# Patient Record
Sex: FEMALE | Race: WHITE | Employment: FULL TIME | URBAN - METROPOLITAN AREA
[De-identification: names, ages, dates, MRNs, and addresses within clinical notes are randomized per-mention and may not be internally consistent; named-entity substitution may affect disease eponyms.]

---

## 2017-08-02 ENCOUNTER — APPOINTMENT (OUTPATIENT)
Dept: CT IMAGING | Age: 42
End: 2017-08-02
Payer: OTHER GOVERNMENT

## 2017-08-02 ENCOUNTER — HOSPITAL ENCOUNTER (EMERGENCY)
Age: 42
Discharge: ANOTHER HOSPITAL | End: 2017-08-02
Attending: EMERGENCY MEDICINE
Payer: OTHER GOVERNMENT

## 2017-08-02 VITALS
RESPIRATION RATE: 20 BRPM | OXYGEN SATURATION: 97 % | SYSTOLIC BLOOD PRESSURE: 120 MMHG | DIASTOLIC BLOOD PRESSURE: 72 MMHG | HEART RATE: 63 BPM | WEIGHT: 145 LBS | HEIGHT: 66 IN | BODY MASS INDEX: 23.3 KG/M2 | TEMPERATURE: 98.6 F

## 2017-08-02 DIAGNOSIS — N20.0 KIDNEY STONE: Primary | ICD-10-CM

## 2017-08-02 LAB
ABSOLUTE EOS #: 0.1 K/UL (ref 0–0.4)
ABSOLUTE LYMPH #: 0.9 K/UL (ref 1–4.8)
ABSOLUTE MONO #: 0.4 K/UL (ref 0.1–1.2)
ALBUMIN SERPL-MCNC: 4.3 G/DL (ref 3.5–5.2)
ALBUMIN/GLOBULIN RATIO: 1.4 (ref 1–2.5)
ALP BLD-CCNC: 116 U/L (ref 35–104)
ALT SERPL-CCNC: 32 U/L (ref 5–33)
AMYLASE: 36 U/L (ref 28–100)
ANION GAP SERPL CALCULATED.3IONS-SCNC: 15 MMOL/L (ref 9–17)
AST SERPL-CCNC: 24 U/L
BASOPHILS # BLD: 1 %
BASOPHILS ABSOLUTE: 0 K/UL (ref 0–0.2)
BILIRUB SERPL-MCNC: 0.36 MG/DL (ref 0.3–1.2)
BILIRUBIN DIRECT: 0.09 MG/DL
BILIRUBIN, INDIRECT: 0.27 MG/DL (ref 0–1)
BUN BLDV-MCNC: 13 MG/DL (ref 6–20)
BUN/CREAT BLD: ABNORMAL (ref 9–20)
CALCIUM SERPL-MCNC: 9.3 MG/DL (ref 8.6–10.4)
CHLORIDE BLD-SCNC: 103 MMOL/L (ref 98–107)
CO2: 23 MMOL/L (ref 20–31)
CREAT SERPL-MCNC: 0.81 MG/DL (ref 0.5–0.9)
DIFFERENTIAL TYPE: ABNORMAL
EOSINOPHILS RELATIVE PERCENT: 1 %
GFR AFRICAN AMERICAN: >60 ML/MIN
GFR NON-AFRICAN AMERICAN: >60 ML/MIN
GFR SERPL CREATININE-BSD FRML MDRD: ABNORMAL ML/MIN/{1.73_M2}
GFR SERPL CREATININE-BSD FRML MDRD: ABNORMAL ML/MIN/{1.73_M2}
GLOBULIN: ABNORMAL G/DL (ref 1.5–3.8)
GLUCOSE BLD-MCNC: 135 MG/DL (ref 70–99)
HCG QUALITATIVE: NEGATIVE
HCT VFR BLD CALC: 30.6 % (ref 36–46)
HEMOGLOBIN: 9.4 G/DL (ref 12–16)
LACTIC ACID: 1.5 MMOL/L (ref 0.5–2.2)
LIPASE: 16 U/L (ref 13–60)
LYMPHOCYTES # BLD: 17 %
MCH RBC QN AUTO: 24.1 PG (ref 26–34)
MCHC RBC AUTO-ENTMCNC: 30.6 G/DL (ref 31–37)
MCV RBC AUTO: 78.8 FL (ref 80–100)
MONOCYTES # BLD: 7 %
PDW BLD-RTO: 15 % (ref 12.5–15.4)
PLATELET # BLD: 265 K/UL (ref 140–450)
PLATELET ESTIMATE: ABNORMAL
PMV BLD AUTO: 8.8 FL (ref 6–12)
POTASSIUM SERPL-SCNC: 4 MMOL/L (ref 3.7–5.3)
RBC # BLD: 3.89 M/UL (ref 4–5.2)
RBC # BLD: ABNORMAL 10*6/UL
SEG NEUTROPHILS: 74 %
SEGMENTED NEUTROPHILS ABSOLUTE COUNT: 4.1 K/UL (ref 1.8–7.7)
SODIUM BLD-SCNC: 141 MMOL/L (ref 135–144)
TOTAL PROTEIN: 7.4 G/DL (ref 6.4–8.3)
WBC # BLD: 5.5 K/UL (ref 3.5–11)
WBC # BLD: ABNORMAL 10*3/UL

## 2017-08-02 PROCEDURE — 84703 CHORIONIC GONADOTROPIN ASSAY: CPT

## 2017-08-02 PROCEDURE — 2580000003 HC RX 258: Performed by: EMERGENCY MEDICINE

## 2017-08-02 PROCEDURE — 83690 ASSAY OF LIPASE: CPT

## 2017-08-02 PROCEDURE — 80076 HEPATIC FUNCTION PANEL: CPT

## 2017-08-02 PROCEDURE — 83605 ASSAY OF LACTIC ACID: CPT

## 2017-08-02 PROCEDURE — 36415 COLL VENOUS BLD VENIPUNCTURE: CPT

## 2017-08-02 PROCEDURE — 96374 THER/PROPH/DIAG INJ IV PUSH: CPT

## 2017-08-02 PROCEDURE — 6360000002 HC RX W HCPCS: Performed by: EMERGENCY MEDICINE

## 2017-08-02 PROCEDURE — 85025 COMPLETE CBC W/AUTO DIFF WBC: CPT

## 2017-08-02 PROCEDURE — 80048 BASIC METABOLIC PNL TOTAL CA: CPT

## 2017-08-02 PROCEDURE — 74176 CT ABD & PELVIS W/O CONTRAST: CPT

## 2017-08-02 PROCEDURE — 96375 TX/PRO/DX INJ NEW DRUG ADDON: CPT

## 2017-08-02 PROCEDURE — 82150 ASSAY OF AMYLASE: CPT

## 2017-08-02 PROCEDURE — 99285 EMERGENCY DEPT VISIT HI MDM: CPT

## 2017-08-02 RX ORDER — 0.9 % SODIUM CHLORIDE 0.9 %
1000 INTRAVENOUS SOLUTION INTRAVENOUS ONCE
Status: COMPLETED | OUTPATIENT
Start: 2017-08-02 | End: 2017-08-02

## 2017-08-02 RX ORDER — KETOROLAC TROMETHAMINE 15 MG/ML
15 INJECTION, SOLUTION INTRAMUSCULAR; INTRAVENOUS ONCE
Status: COMPLETED | OUTPATIENT
Start: 2017-08-02 | End: 2017-08-02

## 2017-08-02 RX ORDER — VENLAFAXINE HYDROCHLORIDE 150 MG/1
150 CAPSULE, EXTENDED RELEASE ORAL DAILY
COMMUNITY

## 2017-08-02 RX ORDER — MORPHINE SULFATE 2 MG/ML
2 INJECTION, SOLUTION INTRAMUSCULAR; INTRAVENOUS ONCE
Status: COMPLETED | OUTPATIENT
Start: 2017-08-02 | End: 2017-08-02

## 2017-08-02 RX ORDER — ONDANSETRON 2 MG/ML
4 INJECTION INTRAMUSCULAR; INTRAVENOUS ONCE
Status: COMPLETED | OUTPATIENT
Start: 2017-08-02 | End: 2017-08-02

## 2017-08-02 RX ADMIN — MORPHINE SULFATE 2 MG: 2 INJECTION, SOLUTION INTRAMUSCULAR; INTRAVENOUS at 14:47

## 2017-08-02 RX ADMIN — MORPHINE SULFATE 2 MG: 2 INJECTION, SOLUTION INTRAMUSCULAR; INTRAVENOUS at 13:57

## 2017-08-02 RX ADMIN — MORPHINE SULFATE 2 MG: 2 INJECTION, SOLUTION INTRAMUSCULAR; INTRAVENOUS at 14:16

## 2017-08-02 ASSESSMENT — PAIN SCALES - GENERAL
PAINLEVEL_OUTOF10: 8
PAINLEVEL_OUTOF10: 8
PAINLEVEL_OUTOF10: 6
PAINLEVEL_OUTOF10: 9
PAINLEVEL_OUTOF10: 8

## 2017-08-02 ASSESSMENT — PAIN DESCRIPTION - DESCRIPTORS
DESCRIPTORS: ACHING;SHARP
DESCRIPTORS: ACHING;SHARP;SPASM
DESCRIPTORS: ACHING;SHARP
DESCRIPTORS: ACHING;SHARP

## 2017-08-02 ASSESSMENT — PAIN DESCRIPTION - LOCATION
LOCATION: ABDOMEN

## 2017-08-02 ASSESSMENT — PAIN DESCRIPTION - ORIENTATION
ORIENTATION: RIGHT;LOWER
ORIENTATION: RIGHT;LOWER
ORIENTATION: RIGHT

## 2017-08-02 ASSESSMENT — PAIN DESCRIPTION - PAIN TYPE
TYPE: ACUTE PAIN

## 2017-08-02 ASSESSMENT — ENCOUNTER SYMPTOMS: ABDOMINAL PAIN: 1

## 2017-08-02 NOTE — ED NOTES
PT transferred to [address] [place] [address] via [facility]. Pt belongings with pt, IV int'd. Pt alert and oriented.       Mariia Christopher RN  08/02/17 0757

## 2017-08-02 NOTE — ED PROVIDER NOTES
[facility] ED  [address] [address]  Phone: [phone]  Fax: [phone]        Pt Name: Jefferson Maguire  MRN: [id number]  [place] 1975  Date of evaluation: 8/2/17      CHIEF COMPLAINT       Chief Complaint   Patient presents with    Abdominal Pain         HISTORY OF PRESENT ILLNESS  (Location/Symptom, Timing/Onset, Context/Setting, Quality, Duration, Modifying Factors, Severity.)    Jefferson Maguire is a 36 y.o. female who presents with abdominal pain. The patient states she has the sudden onset of right lower quadrant abdominal pain. It is a sharp stabbing pain of which certainly movements makes her pain worse nothing makes it better. The patient denies any trauma. No blood in the urine or stool no fever no chills no vomiting no diarrhea, no bad food exposure. The patient does relate that she has had gastric bypass surgery and has had her gallbladder removed       REVIEW OF SYSTEMS    (2-9 systems for level 4, 10 or more for level 5)     Review of Systems   Gastrointestinal: Positive for abdominal pain. All other systems reviewed and are negative. PAST MEDICAL HISTORY    has no past medical history on file. SURGICAL HISTORY      has a past surgical history that includes Cholecystectomy; Gastric bypass surgery; and Ovary removal.    CURRENT MEDICATIONS       Previous Medications    VENLAFAXINE (EFFEXOR XR) 150 MG EXTENDED RELEASE CAPSULE    Take 150 mg by mouth daily       ALLERGIES     has No Known Allergies. FAMILY HISTORY     has no family status information on file. family history is not on file. SOCIAL HISTORY      reports that she has never smoked. She does not have any smokeless tobacco history on file. She reports that she does not drink alcohol or use illicit drugs. PHYSICAL EXAM    (up to 7 for level 4, 8 or more for level 5)   INITIAL VITALS:  height is 5' 6\" (1.676 m) and weight is 65.8 kg (145 lb).  Her oral temperature is 98.6 of  intravenous contrast. Multiplanar reformatted images are provided for review. Dose modulation, iterative reconstruction, and/or weight based adjustment of  the mA/kV was utilized to reduce the radiation dose to as low as reasonably  achievable. COMPARISON:  None. HISTORY:  ORDERING SYSTEM PROVIDED HISTORY: right lower quadrant abdominal pain  TECHNOLOGIST PROVIDED HISTORY:  WITHOUT Contrast  Ordering Physician Provided Reason for Exam: RLQ pain, nausea, vomiting  Acuity: Acute  Type of Exam: Initial  Relevant Medical/Surgical History: Sx: ovary removal, gastric bypass,  cholecystectomy    FINDINGS:  Cardiovascular: Unremarkable    Lower Chest: Unremarkable    Organs    Liver:  Unremarkable    Gallbladder:  Cholecystectomy. Biliary: Unremarkable    Pancreas: Unremarkable    Spleen: Unremarkable    Adrenals:  Unremarkable    Kidneys: 5 mm proximal ureteral obstructive calculus on the right side with  hydronephrosis.  No significant perinephric fat stranding.  Distal ureter is  decompressed without evidence of UVJ stones. Pelvis    Bladder: As above.  Partially distended urinary bladder. Reproductive: Unenhanced appearance of the uterus is unremarkable. GI/ Bowel: Thickening of the antrum of the stomach is identified. Postoperative appearance of the stomach.  There is no colonic wall  thickening, pericolonic inflammation or diverticular disease. Unremarkable  appendix. Peritoneum/ Retroperitoneum: No adenopathy, free air or free fluid. Bones/ Soft Tissues: Osseous structures intact.           LABS:  Results for orders placed or performed during the hospital encounter of 08/02/17   Amylase   Result Value Ref Range    Amylase 36 28 - 100 U/L   Lipase   Result Value Ref Range    Lipase 16 13 - 60 U/L   Hepatic Function Panel   Result Value Ref Range    Alb 4.3 3.5 - 5.2 g/dL    Alkaline Phosphatase 116 (H) 35 - 104 U/L    ALT 32 5 - 33 U/L    AST 24 <32 U/L    Total Bilirubin 0.36 0.3 - 1.2 Height: 5' 6\" (1.676 m)     -------------------------  BP: 127/79, Temp: 98.6 °F (37 °C), Pulse: 63, Resp: 20      RE-EVALUATION:  The patient after the first dose of morphine, had no relief. She was given another dose of morphine and some Toradol. Her pain is now a 6 to a 7 out of 10. The patient still feels uncomfortable. The patient was discussed with urology as the patient is from [address] [place] [address]. Her family doctor from [facility]. She is requesting to be admitted back in her home town urology is agreeable to this admission  I have reviewed the disposition diagnosis with the patient and or their family/guardian. I have answered their questions and given discharge instructions. They voiced understanding of these instructions and did not have any further questions or complaints. CONSULTS:  I spoke with Dr. Milena James who is kind enough to admit the patient to his service for pain control and treatment of the kidney stone. PROCEDURES:  None    FINAL IMPRESSION      1. Kidney stone          DISPOSITION/PLAN   DISPOSITION     CONDITION ON DISPOSITION:   Stable    PATIENT REFERRED TO:    Transferred to [facility], [facility]  Dr. Renetta Bunn MEDICATIONS:  New Prescriptions    No medications on file       (Please note that portions of this note were completed with a voice recognition program.  Efforts were made to edit the dictations but occasionally words are mis-transcribed.)    Donald MD, F.A.C.E.P.   Attending Emergency Medicine Physician       Gerard Wilson MD  08/02/17 [id number]